# Patient Record
Sex: FEMALE | Race: BLACK OR AFRICAN AMERICAN | Employment: FULL TIME | URBAN - METROPOLITAN AREA
[De-identification: names, ages, dates, MRNs, and addresses within clinical notes are randomized per-mention and may not be internally consistent; named-entity substitution may affect disease eponyms.]

---

## 2020-02-05 ENCOUNTER — HOSPITAL ENCOUNTER (OUTPATIENT)
Age: 45
Discharge: HOME | End: 2020-02-05
Payer: COMMERCIAL

## 2020-02-05 VITALS
RESPIRATION RATE: 18 BRPM | OXYGEN SATURATION: 99 % | SYSTOLIC BLOOD PRESSURE: 122 MMHG | TEMPERATURE: 101 F | DIASTOLIC BLOOD PRESSURE: 73 MMHG | HEART RATE: 115 BPM

## 2020-02-05 DIAGNOSIS — J11.1 INFLUENZA-LIKE SYNDROME: Primary | ICD-10-CM

## 2020-02-05 PROCEDURE — 99203 OFFICE O/P NEW LOW 30 MIN: CPT

## 2020-02-05 PROCEDURE — 99204 OFFICE O/P NEW MOD 45 MIN: CPT

## 2020-02-05 RX ORDER — OSELTAMIVIR PHOSPHATE 75 MG/1
75 CAPSULE ORAL 2 TIMES DAILY
Qty: 10 CAPSULE | Refills: 0 | Status: SHIPPED | OUTPATIENT
Start: 2020-02-05 | End: 2020-02-05

## 2020-02-05 RX ORDER — OSELTAMIVIR PHOSPHATE 75 MG/1
75 CAPSULE ORAL 2 TIMES DAILY
Qty: 10 CAPSULE | Refills: 0 | Status: SHIPPED | OUTPATIENT
Start: 2020-02-05 | End: 2020-02-10

## 2020-02-06 NOTE — ED PROVIDER NOTES
Patient Seen in: [facility] END      History   Patient presents with:  Fever  Cough/URI    Stated Complaint: fever, cough, runny nose    HPI    67-year-old female who comes in today complaining of fevers, chills body aches nasal con Neck: Supple; no anterior or posterior cervical adenopathy  Lungs: Clear to auscultation bilaterally, respirations unlabored. No wheezing, rales or rhonchi. Heart: NSR, S1, S2 present. No murmurs, rubs or gallops.   Skin: no rash         ED Course   Lab

## 2021-11-03 ENCOUNTER — OFFICE VISIT (OUTPATIENT)
Dept: OTHER | Facility: HOSPITAL | Age: 46
End: 2021-11-03
Attending: PREVENTIVE MEDICINE
Payer: OTHER MISCELLANEOUS

## 2021-11-03 DIAGNOSIS — Z77.21 EXPOSURE TO BLOOD-BORNE PATHOGEN: Primary | ICD-10-CM

## 2021-11-03 PROCEDURE — 87389 HIV-1 AG W/HIV-1&-2 AB AG IA: CPT

## 2021-11-03 PROCEDURE — 86706 HEP B SURFACE ANTIBODY: CPT

## 2021-11-03 PROCEDURE — 86803 HEPATITIS C AB TEST: CPT
